# Patient Record
Sex: MALE | Race: OTHER | Employment: UNEMPLOYED | URBAN - NONMETROPOLITAN AREA
[De-identification: names, ages, dates, MRNs, and addresses within clinical notes are randomized per-mention and may not be internally consistent; named-entity substitution may affect disease eponyms.]

---

## 2017-04-05 ENCOUNTER — OFFICE VISIT (OUTPATIENT)
Dept: CARDIOLOGY | Age: 62
End: 2017-04-05

## 2017-04-05 VITALS
WEIGHT: 174 LBS | DIASTOLIC BLOOD PRESSURE: 78 MMHG | HEART RATE: 73 BPM | SYSTOLIC BLOOD PRESSURE: 132 MMHG | BODY MASS INDEX: 32.85 KG/M2 | HEIGHT: 61 IN

## 2017-04-05 DIAGNOSIS — I25.810 CORONARY ARTERY DISEASE INVOLVING CORONARY BYPASS GRAFT OF NATIVE HEART WITHOUT ANGINA PECTORIS: Primary | ICD-10-CM

## 2017-04-05 DIAGNOSIS — G81.91 RIGHT HEMIPARESIS (HCC): ICD-10-CM

## 2017-04-05 DIAGNOSIS — E78.00 PURE HYPERCHOLESTEROLEMIA: ICD-10-CM

## 2017-04-05 DIAGNOSIS — Z95.1 S/P CABG X 4: ICD-10-CM

## 2017-04-05 PROCEDURE — 93000 ELECTROCARDIOGRAM COMPLETE: CPT | Performed by: INTERNAL MEDICINE

## 2017-04-05 PROCEDURE — G8598 ASA/ANTIPLAT THER USED: HCPCS | Performed by: INTERNAL MEDICINE

## 2017-04-05 PROCEDURE — 3017F COLORECTAL CA SCREEN DOC REV: CPT | Performed by: INTERNAL MEDICINE

## 2017-04-05 PROCEDURE — 99213 OFFICE O/P EST LOW 20 MIN: CPT | Performed by: INTERNAL MEDICINE

## 2017-04-05 PROCEDURE — G8419 CALC BMI OUT NRM PARAM NOF/U: HCPCS | Performed by: INTERNAL MEDICINE

## 2017-04-05 PROCEDURE — 1036F TOBACCO NON-USER: CPT | Performed by: INTERNAL MEDICINE

## 2017-04-05 PROCEDURE — G8427 DOCREV CUR MEDS BY ELIG CLIN: HCPCS | Performed by: INTERNAL MEDICINE

## 2017-04-05 RX ORDER — ZOLPIDEM TARTRATE 5 MG/1
5 TABLET ORAL NIGHTLY PRN
COMMUNITY
End: 2017-11-15 | Stop reason: ALTCHOICE

## 2017-04-05 RX ORDER — NITROGLYCERIN 0.4 MG/1
0.4 TABLET SUBLINGUAL EVERY 5 MIN PRN
COMMUNITY
End: 2017-08-14 | Stop reason: SDUPTHER

## 2017-04-05 RX ORDER — RANOLAZINE 1000 MG/1
TABLET, EXTENDED RELEASE ORAL
Qty: 60 TABLET | Refills: 3 | Status: SHIPPED | OUTPATIENT
Start: 2017-04-05 | End: 2017-09-17 | Stop reason: SDUPTHER

## 2017-05-10 RX ORDER — ISOSORBIDE MONONITRATE 60 MG/1
TABLET, EXTENDED RELEASE ORAL
Qty: 90 TABLET | Refills: 3 | Status: SHIPPED | OUTPATIENT
Start: 2017-05-10 | End: 2017-08-09 | Stop reason: DRUGHIGH

## 2017-06-15 RX ORDER — FUROSEMIDE 20 MG/1
TABLET ORAL
Qty: 30 TABLET | Refills: 2 | Status: SHIPPED | OUTPATIENT
Start: 2017-06-15 | End: 2017-09-24 | Stop reason: SDUPTHER

## 2017-08-09 ENCOUNTER — OFFICE VISIT (OUTPATIENT)
Dept: CARDIOLOGY CLINIC | Age: 62
End: 2017-08-09
Payer: MEDICARE

## 2017-08-09 VITALS
SYSTOLIC BLOOD PRESSURE: 122 MMHG | WEIGHT: 168 LBS | HEIGHT: 61 IN | BODY MASS INDEX: 31.72 KG/M2 | DIASTOLIC BLOOD PRESSURE: 54 MMHG | HEART RATE: 82 BPM

## 2017-08-09 DIAGNOSIS — Z95.1 S/P CABG X 4: ICD-10-CM

## 2017-08-09 DIAGNOSIS — Z95.1 S/P CABG X 4: Primary | ICD-10-CM

## 2017-08-09 PROCEDURE — 99213 OFFICE O/P EST LOW 20 MIN: CPT | Performed by: INTERNAL MEDICINE

## 2017-08-09 PROCEDURE — 1036F TOBACCO NON-USER: CPT | Performed by: INTERNAL MEDICINE

## 2017-08-09 PROCEDURE — 3017F COLORECTAL CA SCREEN DOC REV: CPT | Performed by: INTERNAL MEDICINE

## 2017-08-09 PROCEDURE — G8417 CALC BMI ABV UP PARAM F/U: HCPCS | Performed by: INTERNAL MEDICINE

## 2017-08-09 PROCEDURE — G8427 DOCREV CUR MEDS BY ELIG CLIN: HCPCS | Performed by: INTERNAL MEDICINE

## 2017-08-09 PROCEDURE — G8598 ASA/ANTIPLAT THER USED: HCPCS | Performed by: INTERNAL MEDICINE

## 2017-08-09 RX ORDER — ISOSORBIDE MONONITRATE 30 MG/1
30 TABLET, EXTENDED RELEASE ORAL DAILY
COMMUNITY
End: 2017-08-09 | Stop reason: SDUPTHER

## 2017-08-09 RX ORDER — ISOSORBIDE MONONITRATE 30 MG/1
30 TABLET, EXTENDED RELEASE ORAL DAILY
Qty: 30 TABLET | Refills: 3 | Status: SHIPPED | OUTPATIENT
Start: 2017-08-09 | End: 2017-12-07 | Stop reason: SDUPTHER

## 2017-08-14 RX ORDER — NITROGLYCERIN 0.4 MG/1
0.4 TABLET SUBLINGUAL EVERY 5 MIN PRN
Qty: 25 TABLET | Refills: 3 | Status: SHIPPED | OUTPATIENT
Start: 2017-08-14

## 2017-09-18 RX ORDER — RANOLAZINE 1000 MG/1
TABLET, FILM COATED, EXTENDED RELEASE ORAL
Qty: 60 TABLET | Refills: 2 | Status: SHIPPED | OUTPATIENT
Start: 2017-09-18 | End: 2017-12-15 | Stop reason: SDUPTHER

## 2017-09-25 RX ORDER — FUROSEMIDE 20 MG/1
TABLET ORAL
Qty: 30 TABLET | Refills: 2 | Status: SHIPPED | OUTPATIENT
Start: 2017-09-25 | End: 2017-12-26 | Stop reason: SDUPTHER

## 2017-11-15 ENCOUNTER — OFFICE VISIT (OUTPATIENT)
Dept: CARDIOLOGY CLINIC | Age: 62
End: 2017-11-15
Payer: MEDICARE

## 2017-11-15 VITALS
DIASTOLIC BLOOD PRESSURE: 60 MMHG | HEART RATE: 72 BPM | BODY MASS INDEX: 32.1 KG/M2 | SYSTOLIC BLOOD PRESSURE: 122 MMHG | WEIGHT: 170 LBS | HEIGHT: 61 IN

## 2017-11-15 DIAGNOSIS — I25.810 CORONARY ARTERY DISEASE INVOLVING CORONARY BYPASS GRAFT OF NATIVE HEART WITHOUT ANGINA PECTORIS: ICD-10-CM

## 2017-11-15 DIAGNOSIS — I10 ESSENTIAL HYPERTENSION: Primary | ICD-10-CM

## 2017-11-15 DIAGNOSIS — Z95.1 S/P CABG X 4: ICD-10-CM

## 2017-11-15 PROCEDURE — 3017F COLORECTAL CA SCREEN DOC REV: CPT | Performed by: INTERNAL MEDICINE

## 2017-11-15 PROCEDURE — 99213 OFFICE O/P EST LOW 20 MIN: CPT | Performed by: INTERNAL MEDICINE

## 2017-11-15 PROCEDURE — G8598 ASA/ANTIPLAT THER USED: HCPCS | Performed by: INTERNAL MEDICINE

## 2017-11-15 PROCEDURE — 1036F TOBACCO NON-USER: CPT | Performed by: INTERNAL MEDICINE

## 2017-11-15 PROCEDURE — G8484 FLU IMMUNIZE NO ADMIN: HCPCS | Performed by: INTERNAL MEDICINE

## 2017-11-15 PROCEDURE — G8427 DOCREV CUR MEDS BY ELIG CLIN: HCPCS | Performed by: INTERNAL MEDICINE

## 2017-11-15 PROCEDURE — G8417 CALC BMI ABV UP PARAM F/U: HCPCS | Performed by: INTERNAL MEDICINE

## 2017-11-27 ENCOUNTER — HOSPITAL ENCOUNTER (OUTPATIENT)
Dept: NON INVASIVE DIAGNOSTICS | Age: 62
Discharge: HOME | End: 2017-11-27
Payer: MEDICARE

## 2017-11-27 DIAGNOSIS — I10 ESSENTIAL HYPERTENSION: ICD-10-CM

## 2017-11-27 DIAGNOSIS — I25.810 CORONARY ARTERY DISEASE INVOLVING CORONARY BYPASS GRAFT OF NATIVE HEART WITHOUT ANGINA PECTORIS: ICD-10-CM

## 2017-11-27 LAB
LV EF: 43 %
LVEF MODALITY: NORMAL

## 2017-11-27 PROCEDURE — 93306 TTE W/DOPPLER COMPLETE: CPT

## 2017-12-07 RX ORDER — ISOSORBIDE MONONITRATE 30 MG/1
TABLET, EXTENDED RELEASE ORAL
Qty: 30 TABLET | Refills: 5 | Status: SHIPPED | OUTPATIENT
Start: 2017-12-07 | End: 2018-06-09 | Stop reason: SDUPTHER

## 2017-12-15 RX ORDER — RANOLAZINE 1000 MG/1
TABLET, FILM COATED, EXTENDED RELEASE ORAL
Qty: 180 TABLET | Refills: 1 | Status: SHIPPED | OUTPATIENT
Start: 2017-12-15 | End: 2018-06-11 | Stop reason: SDUPTHER

## 2017-12-26 RX ORDER — FUROSEMIDE 20 MG/1
TABLET ORAL
Qty: 90 TABLET | Refills: 1 | Status: SHIPPED | OUTPATIENT
Start: 2017-12-26 | End: 2018-06-18 | Stop reason: SDUPTHER

## 2018-05-16 ENCOUNTER — OFFICE VISIT (OUTPATIENT)
Dept: CARDIOLOGY CLINIC | Age: 63
End: 2018-05-16
Payer: MEDICARE

## 2018-05-16 VITALS
HEART RATE: 78 BPM | SYSTOLIC BLOOD PRESSURE: 132 MMHG | BODY MASS INDEX: 31.53 KG/M2 | DIASTOLIC BLOOD PRESSURE: 60 MMHG | WEIGHT: 167 LBS | HEIGHT: 61 IN

## 2018-05-16 DIAGNOSIS — Z87.891 EX-SMOKER: ICD-10-CM

## 2018-05-16 DIAGNOSIS — Z95.1 S/P CABG X 4: ICD-10-CM

## 2018-05-16 DIAGNOSIS — I10 ESSENTIAL HYPERTENSION: ICD-10-CM

## 2018-05-16 DIAGNOSIS — I50.22 CHRONIC SYSTOLIC CONGESTIVE HEART FAILURE, NYHA CLASS 2 (HCC): ICD-10-CM

## 2018-05-16 DIAGNOSIS — E78.5 DYSLIPIDEMIA, GOAL LDL BELOW 100: ICD-10-CM

## 2018-05-16 DIAGNOSIS — I35.0 MILD AORTIC STENOSIS: ICD-10-CM

## 2018-05-16 DIAGNOSIS — I25.810 CORONARY ARTERY DISEASE INVOLVING CORONARY BYPASS GRAFT OF NATIVE HEART WITHOUT ANGINA PECTORIS: Primary | ICD-10-CM

## 2018-05-16 PROCEDURE — 99213 OFFICE O/P EST LOW 20 MIN: CPT | Performed by: INTERNAL MEDICINE

## 2018-05-16 PROCEDURE — 1123F ACP DISCUSS/DSCN MKR DOCD: CPT | Performed by: INTERNAL MEDICINE

## 2018-05-16 PROCEDURE — 3017F COLORECTAL CA SCREEN DOC REV: CPT | Performed by: INTERNAL MEDICINE

## 2018-05-16 PROCEDURE — G8427 DOCREV CUR MEDS BY ELIG CLIN: HCPCS | Performed by: INTERNAL MEDICINE

## 2018-05-16 PROCEDURE — 93000 ELECTROCARDIOGRAM COMPLETE: CPT | Performed by: INTERNAL MEDICINE

## 2018-05-16 PROCEDURE — 1036F TOBACCO NON-USER: CPT | Performed by: INTERNAL MEDICINE

## 2018-05-16 PROCEDURE — G8598 ASA/ANTIPLAT THER USED: HCPCS | Performed by: INTERNAL MEDICINE

## 2018-05-16 PROCEDURE — 4040F PNEUMOC VAC/ADMIN/RCVD: CPT | Performed by: INTERNAL MEDICINE

## 2018-05-16 PROCEDURE — G8417 CALC BMI ABV UP PARAM F/U: HCPCS | Performed by: INTERNAL MEDICINE

## 2018-05-23 ENCOUNTER — HOSPITAL ENCOUNTER (OUTPATIENT)
Dept: ULTRASOUND IMAGING | Age: 63
Discharge: HOME | End: 2018-05-23
Payer: MEDICARE

## 2018-05-23 DIAGNOSIS — Z87.891 EX-SMOKER: ICD-10-CM

## 2018-05-23 PROCEDURE — 76706 US ABDL AORTA SCREEN AAA: CPT

## 2018-06-04 ENCOUNTER — TELEPHONE (OUTPATIENT)
Dept: CARDIOLOGY CLINIC | Age: 63
End: 2018-06-04

## 2018-06-11 RX ORDER — RANOLAZINE 1000 MG/1
TABLET, EXTENDED RELEASE ORAL
Qty: 180 TABLET | Refills: 1 | Status: SHIPPED | OUTPATIENT
Start: 2018-06-11 | End: 2018-12-10 | Stop reason: SDUPTHER

## 2018-06-11 RX ORDER — ISOSORBIDE MONONITRATE 30 MG/1
TABLET, EXTENDED RELEASE ORAL
Qty: 90 TABLET | Refills: 3 | Status: SHIPPED | OUTPATIENT
Start: 2018-06-11 | End: 2019-04-08 | Stop reason: SDUPTHER

## 2018-06-20 RX ORDER — FUROSEMIDE 20 MG/1
TABLET ORAL
Qty: 90 TABLET | Refills: 3 | Status: SHIPPED | OUTPATIENT
Start: 2018-06-20 | End: 2018-06-20 | Stop reason: SDUPTHER

## 2018-06-26 RX ORDER — FUROSEMIDE 20 MG/1
TABLET ORAL
Qty: 90 TABLET | Refills: 3 | Status: SHIPPED | OUTPATIENT
Start: 2018-06-26 | End: 2020-11-18

## 2018-12-10 RX ORDER — RANOLAZINE 1000 MG/1
TABLET, FILM COATED, EXTENDED RELEASE ORAL
Qty: 180 TABLET | Refills: 1 | Status: SHIPPED | OUTPATIENT
Start: 2018-12-10 | End: 2019-04-08 | Stop reason: SDUPTHER

## 2019-03-05 ENCOUNTER — HOSPITAL ENCOUNTER (OUTPATIENT)
Age: 64
Setting detail: SPECIMEN
Discharge: HOME | End: 2019-03-05
Payer: MEDICARE

## 2019-03-05 LAB
ABSOLUTE EOS #: 0.1 K/UL (ref 0–0.44)
ABSOLUTE IMMATURE GRANULOCYTE: 0.05 K/UL (ref 0–0.3)
ABSOLUTE LYMPH #: 0.9 K/UL (ref 1.1–3.7)
ABSOLUTE MONO #: 0.61 K/UL (ref 0.1–1.2)
BASOPHILS # BLD: 0 % (ref 0–2)
BASOPHILS ABSOLUTE: 0.03 K/UL (ref 0–0.2)
DIFFERENTIAL TYPE: ABNORMAL
EOSINOPHILS RELATIVE PERCENT: 1 % (ref 1–4)
HCT VFR BLD CALC: 41 % (ref 40.7–50.3)
HEMOGLOBIN: 13.7 G/DL (ref 13–17)
IMMATURE GRANULOCYTES: 1 %
LYMPHOCYTES # BLD: 13 % (ref 24–43)
MCH RBC QN AUTO: 31.6 PG (ref 25.2–33.5)
MCHC RBC AUTO-ENTMCNC: 33.4 G/DL (ref 28.4–34.8)
MCV RBC AUTO: 94.7 FL (ref 82.6–102.9)
MONOCYTES # BLD: 9 % (ref 3–12)
NRBC AUTOMATED: 0 PER 100 WBC
PDW BLD-RTO: 12.4 % (ref 11.8–14.4)
PLATELET # BLD: 233 K/UL (ref 138–453)
PLATELET ESTIMATE: ABNORMAL
PMV BLD AUTO: 10.6 FL (ref 8.1–13.5)
RBC # BLD: 4.33 M/UL (ref 4.21–5.77)
RBC # BLD: ABNORMAL 10*6/UL
SEG NEUTROPHILS: 76 % (ref 36–65)
SEGMENTED NEUTROPHILS ABSOLUTE COUNT: 5.43 K/UL (ref 1.5–8.1)
WBC # BLD: 7.1 K/UL (ref 3.5–11.3)
WBC # BLD: ABNORMAL 10*3/UL

## 2019-03-06 LAB
IRON SATURATION: 22 % (ref 20–55)
IRON: 65 UG/DL (ref 59–158)
TOTAL IRON BINDING CAPACITY: 296 UG/DL (ref 250–450)
UNSATURATED IRON BINDING CAPACITY: 231 UG/DL (ref 112–347)

## 2019-04-08 RX ORDER — RANOLAZINE 1000 MG/1
TABLET, EXTENDED RELEASE ORAL
Qty: 180 TABLET | Refills: 0 | Status: SHIPPED | OUTPATIENT
Start: 2019-04-08 | End: 2019-06-10 | Stop reason: SDUPTHER

## 2019-04-08 RX ORDER — ISOSORBIDE MONONITRATE 30 MG/1
TABLET, EXTENDED RELEASE ORAL
Qty: 90 TABLET | Refills: 0 | Status: SHIPPED | OUTPATIENT
Start: 2019-04-08 | End: 2019-09-05 | Stop reason: SDUPTHER

## 2019-04-08 RX ORDER — FUROSEMIDE 20 MG/1
TABLET ORAL
Qty: 90 TABLET | Refills: 0 | Status: SHIPPED | OUTPATIENT
Start: 2019-04-08 | End: 2020-10-14 | Stop reason: SDUPTHER

## 2019-05-06 ENCOUNTER — HOSPITAL ENCOUNTER (OUTPATIENT)
Age: 64
Discharge: HOME | End: 2019-05-06
Payer: MEDICARE

## 2019-05-06 ENCOUNTER — HOSPITAL ENCOUNTER (OUTPATIENT)
Dept: GENERAL RADIOLOGY | Age: 64
Discharge: HOME | End: 2019-05-06
Payer: MEDICARE

## 2019-05-06 DIAGNOSIS — R52 PAIN: ICD-10-CM

## 2019-05-06 PROCEDURE — 73630 X-RAY EXAM OF FOOT: CPT

## 2019-06-10 RX ORDER — RANOLAZINE 1000 MG/1
TABLET, EXTENDED RELEASE ORAL
Qty: 180 TABLET | Refills: 2 | Status: SHIPPED | OUTPATIENT
Start: 2019-06-10 | End: 2020-04-28

## 2019-06-14 RX ORDER — FUROSEMIDE 20 MG/1
TABLET ORAL
Qty: 90 TABLET | Refills: 3 | Status: SHIPPED | OUTPATIENT
Start: 2019-06-14 | End: 2020-06-12

## 2019-08-07 ENCOUNTER — OFFICE VISIT (OUTPATIENT)
Dept: CARDIOLOGY CLINIC | Age: 64
End: 2019-08-07
Payer: MEDICARE

## 2019-08-07 VITALS
BODY MASS INDEX: 29.83 KG/M2 | HEIGHT: 61 IN | SYSTOLIC BLOOD PRESSURE: 128 MMHG | DIASTOLIC BLOOD PRESSURE: 70 MMHG | HEART RATE: 70 BPM | WEIGHT: 158 LBS

## 2019-08-07 DIAGNOSIS — Z95.1 S/P CABG X 4: ICD-10-CM

## 2019-08-07 DIAGNOSIS — I25.810 CORONARY ARTERY DISEASE INVOLVING CORONARY BYPASS GRAFT OF NATIVE HEART WITHOUT ANGINA PECTORIS: Primary | ICD-10-CM

## 2019-08-07 PROCEDURE — 1036F TOBACCO NON-USER: CPT | Performed by: PHYSICIAN ASSISTANT

## 2019-08-07 PROCEDURE — G8419 CALC BMI OUT NRM PARAM NOF/U: HCPCS | Performed by: PHYSICIAN ASSISTANT

## 2019-08-07 PROCEDURE — 1123F ACP DISCUSS/DSCN MKR DOCD: CPT | Performed by: PHYSICIAN ASSISTANT

## 2019-08-07 PROCEDURE — G8427 DOCREV CUR MEDS BY ELIG CLIN: HCPCS | Performed by: PHYSICIAN ASSISTANT

## 2019-08-07 PROCEDURE — 3017F COLORECTAL CA SCREEN DOC REV: CPT | Performed by: PHYSICIAN ASSISTANT

## 2019-08-07 PROCEDURE — 99213 OFFICE O/P EST LOW 20 MIN: CPT | Performed by: PHYSICIAN ASSISTANT

## 2019-08-07 PROCEDURE — G8598 ASA/ANTIPLAT THER USED: HCPCS | Performed by: PHYSICIAN ASSISTANT

## 2019-08-07 PROCEDURE — 4040F PNEUMOC VAC/ADMIN/RCVD: CPT | Performed by: PHYSICIAN ASSISTANT

## 2019-08-07 PROCEDURE — 93000 ELECTROCARDIOGRAM COMPLETE: CPT | Performed by: PHYSICIAN ASSISTANT

## 2019-08-07 RX ORDER — MIRTAZAPINE 45 MG/1
1 TABLET, FILM COATED ORAL DAILY
Refills: 3 | COMMUNITY
Start: 2019-07-14 | End: 2020-10-14 | Stop reason: SDUPTHER

## 2019-08-07 NOTE — PROGRESS NOTES
ONE TABLET BY MOUTH TWICE DAILY 180 tablet 3    furosemide (LASIX) 20 MG tablet TAKE ONE TABLET BY MOUTH ONCE DAILY 90 tablet 3    nitroGLYCERIN (NITROSTAT) 0.4 MG SL tablet Place 1 tablet under the tongue every 5 minutes as needed for Chest pain up to max of 3 total doses. If no relief after 1 dose, call 911. 25 tablet 3    pioglitazone (ACTOS) 30 MG tablet TAKE ONE TABLET BY MOUTH ONCE DAILY 30 tablet 5    traMADol (ULTRAM) 50 MG tablet TAKE ONE TABLET BY MOUTH TWICE DAILY AS NEEDED FOR PAIN 60 tablet 2    mirtazapine (REMERON) 15 MG tablet TAKE ONE-HALF TABLET BY MOUTH ONCE DAILY AT BEDTIME 15 tablet 5    enalapril (VASOTEC) 2.5 MG tablet TAKE ONE TABLET BY MOUTH ONCE DAILY 30 tablet 5    sitaGLIPtin (JANUVIA) 100 MG tablet TAKE ONE TABLET BY MOUTH ONCE DAILY 90 tablet 1    metFORMIN (GLUCOPHAGE) 1000 MG tablet TAKE ONE TABLET BY MOUTH TWICE DAILY WITH MEALS 60 tablet 5    pantoprazole (PROTONIX) 40 MG tablet TAKE ONE TABLET BY MOUTH ONCE DAILY 30 tablet 5    pravastatin (PRAVACHOL) 40 MG tablet TAKE ONE TABLET BY MOUTH ONCE DAILY 30 tablet 5    ferrous sulfate 325 (65 FE) MG tablet TAKE ONE TABLET BY MOUTH ONCE DAILY WITH BREAKFAST 30 tablet 5    Omega-3 Fatty Acids (FISH OIL BURP-LESS PO) Take by mouth      FREESTYLE LITE strip USE TO CHECK GLUCOSE ONCE DAILY AS NEEDED 100 each 11    FREESTYLE LITE strip CHECK GLUCOSE ONCE EVERY DAY AS NEEDED 30 each 11    acetaminophen (EQL ARTHRITIS PAIN RELIEF) 650 MG CR tablet Take 650 mg by mouth every 8 hours as needed for Pain.  aspirin 81 MG EC tablet Take 81 mg by mouth daily.  Calcium Polycarbophil (FIBER) 625 MG TABS Take 2 tablets by mouth daily.  Multiple Vitamin (MULTIVITAMIN PO) Take 1 tablet by mouth 2 times daily.  mirtazapine (REMERON) 45 MG tablet Take 1 tablet by mouth daily  3     No current facility-administered medications for this visit.         Social History     Socioeconomic History    Marital status: Single subtotally occluded.       4.    The circumflex has mild luminal irregularity.  It gives rise to OM1 and OM2.  OM1 is a small to medium caliber vessel without any             critical lesion.  OM2 is a small caliber vessel without any critical lesion.  The circumflex is severely diseased distally.        Diagnosis Orders   1. Coronary artery disease involving coronary bypass graft of native heart without angina pectoris  EKG 12 lead   2. S/P CABG x 4 lima to lad patent SVG to PDA patent SVG to CXM patent and diagonal closed          Orders Placed This Encounter   Procedures    EKG 12 lead     Order Specific Question:   Reason for Exam?     Answer: Other     Continue Dr Benton Dodd current treatment plan    Continue same current medications and with constant vigilance to changes in symptoms and also any potential side effects. Return for care if become worse or seek medical attention immediately. The patient is educated on symptoms of heart disease that include chest pain and passing out, dizziness, etc and to report them if there is any change or go to emergency room.        Follow up with myself in 1 year or sooner if needed  (Please note that portions of this note were completed with a voice recognition program.  Efforts were made to edit the dictation but occasionally words are mis-transcribed.)      Hannah Orona PA-C

## 2019-09-09 RX ORDER — ISOSORBIDE MONONITRATE 30 MG/1
TABLET, EXTENDED RELEASE ORAL
Qty: 90 TABLET | Refills: 4 | Status: SHIPPED | OUTPATIENT
Start: 2019-09-09

## 2020-02-04 ENCOUNTER — TELEPHONE (OUTPATIENT)
Dept: CARDIOLOGY CLINIC | Age: 65
End: 2020-02-04

## 2020-02-27 ENCOUNTER — TELEPHONE (OUTPATIENT)
Dept: CARDIOLOGY CLINIC | Age: 65
End: 2020-02-27

## 2020-02-27 NOTE — TELEPHONE ENCOUNTER
Pt called back explained to him its generic, he voiced understanding and will let us know if he needs anything else

## 2020-04-28 RX ORDER — RANOLAZINE 1000 MG/1
TABLET, EXTENDED RELEASE ORAL
Qty: 180 TABLET | Refills: 0 | Status: SHIPPED | OUTPATIENT
Start: 2020-04-28

## 2020-06-12 RX ORDER — FUROSEMIDE 20 MG/1
TABLET ORAL
Qty: 90 TABLET | Refills: 0 | Status: SHIPPED | OUTPATIENT
Start: 2020-06-12 | End: 2020-09-10

## 2020-08-03 RX ORDER — RANOLAZINE 1000 MG/1
TABLET, EXTENDED RELEASE ORAL
Qty: 180 TABLET | Refills: 0 | OUTPATIENT
Start: 2020-08-03

## 2020-09-10 RX ORDER — FUROSEMIDE 20 MG/1
TABLET ORAL
Qty: 90 TABLET | Refills: 0 | Status: SHIPPED | OUTPATIENT
Start: 2020-09-10 | End: 2020-10-14

## 2020-09-16 ENCOUNTER — HOSPITAL ENCOUNTER (OUTPATIENT)
Age: 65
Discharge: HOME | End: 2020-09-16
Payer: COMMERCIAL

## 2020-09-16 ENCOUNTER — OFFICE VISIT (OUTPATIENT)
Dept: CARDIOLOGY CLINIC | Age: 65
End: 2020-09-16
Payer: COMMERCIAL

## 2020-09-16 VITALS
HEIGHT: 60 IN | SYSTOLIC BLOOD PRESSURE: 130 MMHG | HEART RATE: 73 BPM | WEIGHT: 155 LBS | BODY MASS INDEX: 30.43 KG/M2 | DIASTOLIC BLOOD PRESSURE: 68 MMHG

## 2020-09-16 DIAGNOSIS — I25.810 CORONARY ARTERY DISEASE INVOLVING CORONARY BYPASS GRAFT OF NATIVE HEART WITHOUT ANGINA PECTORIS: ICD-10-CM

## 2020-09-16 DIAGNOSIS — I63.50 CEREBRAL ARTERY OCCLUSION WITH CEREBRAL INFARCTION (HCC): ICD-10-CM

## 2020-09-16 LAB
BASOPHILS # BLD: 0.4 %
BASOPHILS ABSOLUTE: 0 THOU/MM3 (ref 0–0.1)
EOSINOPHIL # BLD: 1.4 %
EOSINOPHILS ABSOLUTE: 0.1 THOU/MM3 (ref 0–0.4)
ERYTHROCYTE [DISTWIDTH] IN BLOOD BY AUTOMATED COUNT: 13.2 % (ref 11.5–14.5)
ERYTHROCYTE [DISTWIDTH] IN BLOOD BY AUTOMATED COUNT: 47.8 FL (ref 35–45)
HCT VFR BLD CALC: 37.4 % (ref 42–52)
HEMOGLOBIN: 12.1 GM/DL (ref 14–18)
IMMATURE GRANS (ABS): 0.02 THOU/MM3 (ref 0–0.07)
IMMATURE GRANULOCYTES: 0.3 %
LYMPHOCYTES # BLD: 11.7 %
LYMPHOCYTES ABSOLUTE: 0.8 THOU/MM3 (ref 1–4.8)
MCH RBC QN AUTO: 32.3 PG (ref 26–33)
MCHC RBC AUTO-ENTMCNC: 32.4 GM/DL (ref 32.2–35.5)
MCV RBC AUTO: 99.7 FL (ref 80–94)
MONOCYTES # BLD: 8.1 %
MONOCYTES ABSOLUTE: 0.6 THOU/MM3 (ref 0.4–1.3)
NUCLEATED RED BLOOD CELLS: 0 /100 WBC
PLATELET # BLD: 207 THOU/MM3 (ref 130–400)
PMV BLD AUTO: 11.4 FL (ref 9.4–12.4)
RBC # BLD: 3.75 MILL/MM3 (ref 4.7–6.1)
SEG NEUTROPHILS: 78.1 %
SEGMENTED NEUTROPHILS ABSOLUTE COUNT: 5.4 THOU/MM3 (ref 1.8–7.7)
WBC # BLD: 6.9 THOU/MM3 (ref 4.8–10.8)

## 2020-09-16 PROCEDURE — 83880 ASSAY OF NATRIURETIC PEPTIDE: CPT

## 2020-09-16 PROCEDURE — 4004F PT TOBACCO SCREEN RCVD TLK: CPT | Performed by: INTERNAL MEDICINE

## 2020-09-16 PROCEDURE — 80048 BASIC METABOLIC PNL TOTAL CA: CPT

## 2020-09-16 PROCEDURE — G8417 CALC BMI ABV UP PARAM F/U: HCPCS | Performed by: INTERNAL MEDICINE

## 2020-09-16 PROCEDURE — 3017F COLORECTAL CA SCREEN DOC REV: CPT | Performed by: INTERNAL MEDICINE

## 2020-09-16 PROCEDURE — 1123F ACP DISCUSS/DSCN MKR DOCD: CPT | Performed by: INTERNAL MEDICINE

## 2020-09-16 PROCEDURE — G8427 DOCREV CUR MEDS BY ELIG CLIN: HCPCS | Performed by: INTERNAL MEDICINE

## 2020-09-16 PROCEDURE — 85025 COMPLETE CBC W/AUTO DIFF WBC: CPT

## 2020-09-16 PROCEDURE — 4040F PNEUMOC VAC/ADMIN/RCVD: CPT | Performed by: INTERNAL MEDICINE

## 2020-09-16 PROCEDURE — 36415 COLL VENOUS BLD VENIPUNCTURE: CPT

## 2020-09-16 PROCEDURE — 93000 ELECTROCARDIOGRAM COMPLETE: CPT | Performed by: INTERNAL MEDICINE

## 2020-09-16 PROCEDURE — 99214 OFFICE O/P EST MOD 30 MIN: CPT | Performed by: INTERNAL MEDICINE

## 2020-09-16 NOTE — PROGRESS NOTES
Pt here for 1 year check up     Denies chest pain, SOB or palpitations    C/O indigestion.      EKG DONE

## 2020-09-16 NOTE — PROGRESS NOTES
[address] [place], [place] [address] [facility] [address]  Dept: [phone]  Dept Fax: [phone]  Loc: [phone]    Visit Date: 9/16/2020    Mr. Yvon Kim is a 77 y.o. male  who presented for:  Chief Complaint   Patient presents with    1 Year Follow Up       HPI:   76 yo M c CAD s/p CABG, DM, LV dysfunction EF 40-45%,  CVA is here for a follow up. Denies any chest pain, sob, palpitations, lightheadedness, dizziness, orthopnea, PND or pedal edema. Current Outpatient Medications:     furosemide (LASIX) 20 MG tablet, Take 1 tablet by mouth once daily, Disp: 90 tablet, Rfl: 0    ranolazine (RANEXA) 1000 MG extended release tablet, Take 1 tablet by mouth twice daily, Disp: 180 tablet, Rfl: 0    metoprolol tartrate (LOPRESSOR) 25 MG tablet, TAKE 1 TABLET BY MOUTH TWICE DAILY, Disp: 180 tablet, Rfl: 3    isosorbide mononitrate (IMDUR) 30 MG extended release tablet, TAKE 1 TABLET BY MOUTH ONCE DAILY, Disp: 90 tablet, Rfl: 4    mirtazapine (REMERON) 45 MG tablet, Take 1 tablet by mouth daily, Disp: , Rfl: 3    furosemide (LASIX) 20 MG tablet, TAKE 1 TABLET BY MOUTH ONCE DAILY, Disp: 90 tablet, Rfl: 0    furosemide (LASIX) 20 MG tablet, TAKE ONE TABLET BY MOUTH ONCE DAILY, Disp: 90 tablet, Rfl: 3    nitroGLYCERIN (NITROSTAT) 0.4 MG SL tablet, Place 1 tablet under the tongue every 5 minutes as needed for Chest pain up to max of 3 total doses.  If no relief after 1 dose, call 911., Disp: 25 tablet, Rfl: 3    pioglitazone (ACTOS) 30 MG tablet, TAKE ONE TABLET BY MOUTH ONCE DAILY, Disp: 30 tablet, Rfl: 5    traMADol (ULTRAM) 50 MG tablet, TAKE ONE TABLET BY MOUTH TWICE DAILY AS NEEDED FOR PAIN, Disp: 60 tablet, Rfl: 2    mirtazapine (REMERON) 15 MG tablet, TAKE ONE-HALF TABLET BY MOUTH ONCE DAILY AT BEDTIME, Disp: 15 tablet, Rfl: 5    enalapril (VASOTEC) 2.5 MG tablet, TAKE ONE TABLET BY MOUTH ONCE DAILY, Disp: 30 tablet, Rfl: 5    sitaGLIPtin (Sara Mclaughlin) 100 MG tablet, TAKE ONE TABLET BY MOUTH ONCE DAILY, Disp: 90 tablet, Rfl: 1    metFORMIN (GLUCOPHAGE) 1000 MG tablet, TAKE ONE TABLET BY MOUTH TWICE DAILY WITH MEALS, Disp: 60 tablet, Rfl: 5    pantoprazole (PROTONIX) 40 MG tablet, TAKE ONE TABLET BY MOUTH ONCE DAILY, Disp: 30 tablet, Rfl: 5    pravastatin (PRAVACHOL) 40 MG tablet, TAKE ONE TABLET BY MOUTH ONCE DAILY, Disp: 30 tablet, Rfl: 5    ferrous sulfate 325 (65 FE) MG tablet, TAKE ONE TABLET BY MOUTH ONCE DAILY WITH BREAKFAST, Disp: 30 tablet, Rfl: 5    Omega-3 Fatty Acids (FISH OIL BURP-LESS PO), Take by mouth, Disp: , Rfl:     FREESTYLE LITE strip, USE TO CHECK GLUCOSE ONCE DAILY AS NEEDED, Disp: 100 each, Rfl: 11    FREESTYLE LITE strip, CHECK GLUCOSE ONCE EVERY DAY AS NEEDED, Disp: 30 each, Rfl: 11    acetaminophen (EQL ARTHRITIS PAIN RELIEF) 650 MG CR tablet, Take 650 mg by mouth every 8 hours as needed for Pain., Disp: , Rfl:     aspirin 81 MG EC tablet, Take 81 mg by mouth daily. , Disp: , Rfl:     Calcium Polycarbophil (FIBER) 625 MG TABS, Take 2 tablets by mouth daily. , Disp: , Rfl:     Multiple Vitamin (MULTIVITAMIN PO), Take 1 tablet by mouth 2 times daily. , Disp: , Rfl:     Past [address]  has a past medical history of Arthritis, CAD (coronary artery disease), Cataract, Glaucoma, Hearing aid worn, Hypertension, Type II or unspecified type diabetes mellitus without mention of complication, not stated as uncontrolled, and Unspecified cerebral artery occlusion with cerebral infarction. Social History  Pieter  reports that he quit smoking about 35 years ago. He has never used smokeless tobacco. He reports that he does not drink alcohol or use drugs. Family History  Pieter family history includes Diabetes in his mother; Heart Disease in his father and mother; High Blood Pressure in his mother.     Past Surgical History   Past Surgical History:   Procedure Laterality Date    COLONOSCOPY  9/2/15    CORONARY ARTERY BYPASS GRAFT 12-30-11    NECK SURGERY      left side blood clot    STOMACH SURGERY      UPPER GASTROINTESTINAL ENDOSCOPY         Subjective:     REVIEW OF SYSTEMS  Constitutional: denies sweats, chills and fever  HENT: denies  congestion, sinus pressure, sneezing and sore throat. Eyes: denies  pain, discharge, redness and itching. Respiratory: denies apnea, cough  Gastrointestinal: denies blood in stool, constipation, diarrhea   Endocrine: denies cold intolerance, heat intolerance, polydipsia. Genitourinary: denies dysuria, enuresis, flank pain and hematuria. Musculoskeletal: denies arthralgias, joint swelling and neck pain. Neurological: denies numbness and headaches. Psychiatric/Behavioral: denies agitation, confusion, decreased concentration and dysphoric mood    All others reviewed and are negative. Objective:     /68   Pulse 73   Ht 5' (1.524 m)   Wt 155 lb (70.3 kg)   BMI 30.27 kg/m²     Wt Readings from Last 3 Encounters:   09/16/20 155 lb (70.3 kg)   08/07/19 158 lb (71.7 kg)   05/16/18 167 lb (75.8 kg)     BP Readings from Last 3 Encounters:   09/16/20 130/68   08/07/19 128/70   05/16/18 132/60       PHYSICAL EXAM  Constitutional: Oriented to person, place, and time. Appears well-developed and well-nourished. HENT:   Head: Normocephalic and atraumatic. Eyes: EOM are normal. Pupils are equal, round, and reactive to light. Neck: Normal range of motion. Neck supple. No JVD present. Cardiovascular: Normal rate , normal heart sounds and intact distal pulses. Pulmonary/Chest: Effort normal and breath sounds normal. No respiratory distress. No wheezes. No rales. Abdominal: Soft. Bowel sounds are normal. No distension. There is no tenderness. Musculoskeletal: Normal range of motion. No edema. Neurological: Alert and oriented to person, place, and time. No cranial nerve deficit. Coordination normal.   Skin: Skin is warm and dry. Psychiatric: Normal mood and affect.        Lab Results further questions. Return in about 4 weeks (around 10/14/2020) for Review testing, Regular follow up.        Electronically signed by Janessa Pearce MD [facility]  9/16/2020 at 3:22 PM EDT

## 2020-09-17 LAB
ANION GAP SERPL CALCULATED.3IONS-SCNC: 10 MEQ/L (ref 8–16)
BUN BLDV-MCNC: 17 MG/DL (ref 7–22)
CALCIUM SERPL-MCNC: 8.8 MG/DL (ref 8.5–10.5)
CHLORIDE BLD-SCNC: 100 MEQ/L (ref 98–111)
CO2: 27 MEQ/L (ref 23–33)
CREAT SERPL-MCNC: 0.8 MG/DL (ref 0.4–1.2)
GFR SERPL CREATININE-BSD FRML MDRD: > 90 ML/MIN/1.73M2
GLUCOSE BLD-MCNC: 177 MG/DL (ref 70–108)
POTASSIUM SERPL-SCNC: 5 MEQ/L (ref 3.5–5.2)
PRO-BNP: 1541 PG/ML (ref 0–900)
SODIUM BLD-SCNC: 137 MEQ/L (ref 135–145)

## 2020-09-21 ENCOUNTER — HOSPITAL ENCOUNTER (OUTPATIENT)
Dept: NON INVASIVE DIAGNOSTICS | Age: 65
Discharge: HOME | End: 2020-09-21
Payer: MEDICARE

## 2020-09-21 LAB
LV EF: 46 %
LVEF MODALITY: NORMAL

## 2020-09-21 PROCEDURE — 93306 TTE W/DOPPLER COMPLETE: CPT

## 2020-10-14 ENCOUNTER — OFFICE VISIT (OUTPATIENT)
Dept: CARDIOLOGY CLINIC | Age: 65
End: 2020-10-14
Payer: MEDICARE

## 2020-10-14 VITALS
HEART RATE: 64 BPM | HEIGHT: 60 IN | BODY MASS INDEX: 30.63 KG/M2 | WEIGHT: 156 LBS | DIASTOLIC BLOOD PRESSURE: 60 MMHG | SYSTOLIC BLOOD PRESSURE: 118 MMHG

## 2020-10-14 PROCEDURE — 1036F TOBACCO NON-USER: CPT | Performed by: INTERNAL MEDICINE

## 2020-10-14 PROCEDURE — 1123F ACP DISCUSS/DSCN MKR DOCD: CPT | Performed by: INTERNAL MEDICINE

## 2020-10-14 PROCEDURE — G8484 FLU IMMUNIZE NO ADMIN: HCPCS | Performed by: INTERNAL MEDICINE

## 2020-10-14 PROCEDURE — 3017F COLORECTAL CA SCREEN DOC REV: CPT | Performed by: INTERNAL MEDICINE

## 2020-10-14 PROCEDURE — 4040F PNEUMOC VAC/ADMIN/RCVD: CPT | Performed by: INTERNAL MEDICINE

## 2020-10-14 PROCEDURE — 99214 OFFICE O/P EST MOD 30 MIN: CPT | Performed by: INTERNAL MEDICINE

## 2020-10-14 PROCEDURE — G8417 CALC BMI ABV UP PARAM F/U: HCPCS | Performed by: INTERNAL MEDICINE

## 2020-10-14 PROCEDURE — G8427 DOCREV CUR MEDS BY ELIG CLIN: HCPCS | Performed by: INTERNAL MEDICINE

## 2020-10-14 RX ORDER — FOLIC ACID/MV,IRON,MIN/LUTEIN 0.4-18-25
TABLET ORAL
COMMUNITY
Start: 2019-03-01 | End: 2020-10-14

## 2020-10-14 RX ORDER — MIRTAZAPINE 45 MG/1
45 TABLET, FILM COATED ORAL NIGHTLY
COMMUNITY

## 2020-10-14 RX ORDER — PYRITHIONE ZINC 10 MG/ML
SHAMPOO TOPICAL
COMMUNITY
Start: 2019-03-01

## 2020-10-14 RX ORDER — TRAZODONE HYDROCHLORIDE 50 MG/1
TABLET ORAL
COMMUNITY
Start: 2019-01-26 | End: 2020-10-14

## 2020-10-14 NOTE — PROGRESS NOTES
[address] [place] [address] [facility] [address]  Dept: [phone]  Dept Fax: [phone]  Loc: [phone]    Visit Date: 10/14/2020    Mr. Bety Zarco is a 77 y.o. male  who presented for:  Chief Complaint   Patient presents with    Check-Up     fu echo     Coronary Artery Disease       HPI:   76 yo M c CAD s/p CABG, DM, LV dysfunction EF 40-45%,  CVA is here for a follow up. Patient has a fall recently, states he felt weak and had diarrhea. Denies any chest pain, sob, palpitations, lightheadedness, dizziness, orthopnea, PND or pedal edema. Current Outpatient Medications:     methylcellulose (EQ FIBER THERAPY) 500 MG TABS, EQ Fiber Therapy 500MG Oral Tablet EQ Fiber Therapy 500MG Oral Tablet 03/01/2019 Provider: 03-  [facility] ([id number]), Disp: , Rfl:     mirtazapine (REMERON) 45 MG tablet, Take 45 mg by mouth nightly, Disp: , Rfl:     ranolazine (RANEXA) 1000 MG extended release tablet, Take 1 tablet by mouth twice daily, Disp: 180 tablet, Rfl: 0    metoprolol tartrate (LOPRESSOR) 25 MG tablet, TAKE 1 TABLET BY MOUTH TWICE DAILY, Disp: 180 tablet, Rfl: 3    isosorbide mononitrate (IMDUR) 30 MG extended release tablet, TAKE 1 TABLET BY MOUTH ONCE DAILY, Disp: 90 tablet, Rfl: 4    furosemide (LASIX) 20 MG tablet, TAKE ONE TABLET BY MOUTH ONCE DAILY, Disp: 90 tablet, Rfl: 3    nitroGLYCERIN (NITROSTAT) 0.4 MG SL tablet, Place 1 tablet under the tongue every 5 minutes as needed for Chest pain up to max of 3 total doses.  If no relief after 1 dose, call 911., Disp: 25 tablet, Rfl: 3    pioglitazone (ACTOS) 30 MG tablet, TAKE ONE TABLET BY MOUTH ONCE DAILY, Disp: 30 tablet, Rfl: 5    traMADol (ULTRAM) 50 MG tablet, TAKE ONE TABLET BY MOUTH TWICE DAILY AS NEEDED FOR PAIN, Disp: 60 tablet, Rfl: 2    enalapril (VASOTEC) 2.5 MG tablet, TAKE ONE TABLET BY MOUTH ONCE DAILY, Disp: 30 tablet, Rfl: 5    sitaGLIPtin GASTROINTESTINAL ENDOSCOPY         Subjective:     REVIEW OF SYSTEMS  Constitutional: denies sweats, chills and fever  HENT: denies  congestion, sinus pressure, sneezing and sore throat. Eyes: denies  pain, discharge, redness and itching. Respiratory: denies apnea, cough  Gastrointestinal: denies blood in stool, constipation, diarrhea   Endocrine: denies cold intolerance, heat intolerance, polydipsia. Genitourinary: denies dysuria, enuresis, flank pain and hematuria. Musculoskeletal: denies arthralgias, joint swelling and neck pain. Neurological: denies numbness and headaches. Psychiatric/Behavioral: denies agitation, confusion, decreased concentration and dysphoric mood    All others reviewed and are negative. Objective:     /60   Pulse 64   Ht 5' (1.524 m)   Wt 156 lb (70.8 kg)   BMI 30.47 kg/m²     Wt Readings from Last 3 Encounters:   10/14/20 156 lb (70.8 kg)   09/16/20 155 lb (70.3 kg)   08/07/19 158 lb (71.7 kg)     BP Readings from Last 3 Encounters:   10/14/20 118/60   09/16/20 130/68   08/07/19 128/70       PHYSICAL EXAM  Constitutional: Oriented to person, place, and time. Appears well-developed and well-nourished. HENT:   Head: Normocephalic and atraumatic. Eyes: EOM are normal. Pupils are equal, round, and reactive to light. Neck: Normal range of motion. Neck supple. No JVD present. Cardiovascular: Normal rate , normal heart sounds and intact distal pulses. Pulmonary/Chest: Effort normal and breath sounds normal. No respiratory distress. No wheezes. No rales. Abdominal: Soft. Bowel sounds are normal. No distension. There is no tenderness. Musculoskeletal: Normal range of motion. No edema. Neurological: Alert and oriented to person, place, and time. No cranial nerve deficit. Coordination normal.   Skin: Skin is warm and dry. Psychiatric: Normal mood and affect.        Lab Results   Component Value Date    CKTOTAL 168 12/14/2011    CKTOTAL 310 12/13/2011    CKMB 1.6 12/14/2011    CKMB 3.7 12/13/2011       Lab Results   Component Value Date    WBC 6.9 09/16/2020    RBC 3.75 09/16/2020    RBC 2.26 01/03/2012    HGB 12.1 09/16/2020    HCT 37.4 09/16/2020    MCV 99.7 09/16/2020    MCH 32.3 09/16/2020    MCHC 32.4 09/16/2020    RDW 12.4 03/05/2019     09/16/2020    MPV 11.4 09/16/2020       Lab Results   Component Value Date     09/16/2020    K 5.0 09/16/2020     09/16/2020    CO2 27 09/16/2020    BUN 17 09/16/2020    LABALBU 4.3 05/13/2016    LABALBU 3.5 12/17/2011    CREATININE 0.8 09/16/2020    CALCIUM 8.8 09/16/2020    LABGLOM >90 09/16/2020    GLUCOSE 177 09/16/2020    GLUCOSE 79 01/03/2012       Lab Results   Component Value Date    ALKPHOS 49 05/13/2016    ALT 24 05/13/2016    AST 25 05/13/2016    PROT 6.9 05/13/2016    BILITOT 0.4 05/13/2016    BILIDIR 0.2 12/17/2011    LABALBU 4.3 05/13/2016    LABALBU 3.5 12/17/2011       Lab Results   Component Value Date    MG 3.1 01/03/2012       Lab Results   Component Value Date    PROTIME 1.04 12/30/2011    PROTIME 1.02 12/29/2011         Lab Results   Component Value Date    LABA1C 7.5 11/22/2016       Lab Results   Component Value Date    TRIG 227 05/13/2016    HDL 38 05/13/2016    LDLCALC 44 05/13/2016       Lab Results   Component Value Date    TSH 1.188 08/07/2012         Testing Reviewed:      I haveindividually reviewed the below cardiac tests    EKG:    ECHO:   Results for orders placed during the hospital encounter of 11/27/17   ECHO Complete 2D W Doppler W Color    Narrative Transthoracic Echocardiography Report (TTE)     Demographics      Patient Name   Laith Funes  Gender              Male                  S      MR #           [id number]      Race                Other                                    Ethnicity            or       Account #      [de-identified]      Room Number      Accession      [id number]      Date of Study       11/27/2017   Number      Date of Birth  1955 Referring Physician Man Sams MD      Age            57 year(s)     Sonographer         Randy Marques, [facility]                                    Interpreting        Marni Montez DO                                 Physician     Procedure    Type of Study      TTE procedure:ECHOCARDIOGRAM COMPLETE 2D W DOPPLER W COLOR. Procedure Date  Date: 11/27/2017 Start: 03:13 PM    Study Location: Echo Lab  Technical Quality: Limited visualization due to poor acoustical window. Indications:Hypertension. Additional Medical History: Former smoker, coronary artery disease, CABG,  diabetes, hypertension, hyperlipidemia, GERD    Patient Status: Routine    Height: 61 inches Weight: 170 pounds BSA: 1.76 m^2 BMI: 32.12 kg/m^2    BP: 122/60 mmHg     Conclusions      Summary   There was mild global hypokinesis of the left ventricle. Ejection fraction is visually estimated at 40 to 45%. Mildly dilated left ventricle. Mildly dilated left atrium. Leaflets exhibited mildly increased thickness and mildly reduced cuspal   separation of the aortic valve. Structurally normal aortic valve. There is mild aortic stenosis with valve area of 1.59 sq cm. The maximum aortic valve gradient is 8 mmHg, the mean gradient is 5 mmHg,   and the peak velocity is 142 cm/s. Mild calcification of the posterior leaflet of the mitral valve. Mitral valve leaflet mobility is normal .   Trace mitral regurgitation is present. Signature      ----------------------------------------------------------------   Electronically signed by Marni Montez DO (Interpreting   physician) on 11/27/2017 at 05:56 PM   ----------------------------------------------------------------      Findings      Mitral Valve   Mild calcification of the posterior leaflet of the mitral valve. Mitral valve leaflet mobility is normal .   Trace mitral regurgitation is present.       Aortic Valve   Leaflets exhibited mildly increased thickness and mildly reduced cuspal   separation of the aortic valve. Structurally normal aortic valve. There is mild aortic stenosis with valve area of 1.59 sq cm. The maximum aortic valve gradient is 8 mmHg, the mean gradient is 5 mmHg,   and the peak velocity is 142 cm/s. Tricuspid Valve   Tricuspid valve is structurally normal.   Trivial tricuspid regurgitation visualized. Pulmonic Valve   Pulmonic valve is structurally normal.   Trivial pulmonic regurgitation visualized. Left Atrium   Mildly dilated left atrium. Left Ventricle   There was mild global hypokinesis of the left ventricle. Ejection fraction is visually estimated at 40 to 45%. Mildly dilated left ventricle. Right Atrium   The right atrium is of normal size. Right Ventricle   Normal right ventricular size and function. Pericardial Effusion   No evidence of any pericardial effusion. Aorta / Great Vessels   Aortic root dimension within normal limits.      M-Mode/2D Measurements & Calculations      LV Diastolic    LV Systolic Dimension: 4 cm AV Cusp Separation: 1.8 cmLA   Dimension: 5 cm LV Volume Diastolic: 219 ml Dimension: 4.5 cmAO Root   LV FS:20 %      LV Volume Systolic: 70 ml   Dimension: 3.1 cm   LV PW           LV EDV/LV EDV Index: 246   Diastolic: 1.2  MB/01 Q^8DC ESV/LV ESV   cm              Index: 70 ml/40 m^2   Septum          EF Calculated: 40.7 %       RV Diastolic Dimension: 3.2 cm   Diastolic: 1.2   cm                                          LA/Aorta: 1.45                      LVOT: 1.9 cm     Doppler Measurements & Calculations      MV Peak E-Wave: 90.8 AV Peak Velocity: 142    LVOT Peak Velocity: 79.7   cm/s                 cm/s                     cm/s   MV Peak A-Wave: 102  AV Peak Gradient: 8.07   LVOT Mean Velocity: 53.4   cm/s                 mmHg                     cm/s   MV E/A Ratio: 0.89   AV Mean Velocity: 104    LVOT Peak Gradient: 3   MV Peak Gradient:    cm/s                     mmHgLVOT Mean Gradient: 1   3.3 mmHg             AV Mean Gradient: 5 mmHg mmHg                        AV VTI: 28.7 cm   MV Deceleration      AV Area (Continuity):1.5 TV Peak E-Wave: 45.4 cm/s   Time: 211 msec       cm^2                     TV Peak A-Wave: 47.4 cm/s                           LVOT VTI: 15.2 cm        TV Peak Gradient: 0.82 mmHg   MV E' Septal         IVRT: 85 msec   Velocity: 4.68 cm/s                           PV Peak Velocity: 70.6 cm/s   MV A' Septal                                  PV Peak Gradient: 1.99 mmHg   Velocity: 7.51 cm/s  AV DVI (VTI): 0.53AV DVI   MV E' Lateral        (Vmax):0.56   Velocity: 9.26 cm/s   MV A' Lateral   Velocity: 7.7 cm/s   E/E' septal: 19.4   E/E' lateral: 9.81   MR Velocity: 458   cm/s     [email or web address]  [id number]       STRESS:    CATH:    Assessment/Plan       Diagnosis Orders   1. Shortness of breath       Shortness of breath  CAD s/p CABG  Ischemic CMP EF 40-45%  DM  CVA    Reports some shortness of breath  Got pro BNP which is elevated  Advised to increase his lasix 20mg to 40mg x 4 days and go back to 20mg   Walks with a roller walker  Reviewed EKG  The patient is asked to make an attempt to improve diet and exercise patterns to aid in medical management of this problem. Advised more plant based nutrition/meditarrean diet   Advised patient to call office or seek immediate medical attention if there is any new onset of  any chest pain, sob, palpitations, lightheadedness, dizziness, orthopnea, PND or pedal edema. All medication side effects were discussed in details. Thank youfor allowing me to participate in the care of this patient. Please do not hesitate to contact me for any further questions. Return in about 8 weeks (around 12/9/2020) for Regular follow up, Review testing.

## 2020-11-18 ENCOUNTER — OFFICE VISIT (OUTPATIENT)
Dept: CARDIOLOGY CLINIC | Age: 65
End: 2020-11-18
Payer: MEDICARE

## 2020-11-18 VITALS
HEART RATE: 80 BPM | SYSTOLIC BLOOD PRESSURE: 128 MMHG | DIASTOLIC BLOOD PRESSURE: 70 MMHG | WEIGHT: 156 LBS | HEIGHT: 60 IN | BODY MASS INDEX: 30.63 KG/M2

## 2020-11-18 PROCEDURE — 3017F COLORECTAL CA SCREEN DOC REV: CPT | Performed by: INTERNAL MEDICINE

## 2020-11-18 PROCEDURE — 99213 OFFICE O/P EST LOW 20 MIN: CPT | Performed by: INTERNAL MEDICINE

## 2020-11-18 PROCEDURE — G8417 CALC BMI ABV UP PARAM F/U: HCPCS | Performed by: INTERNAL MEDICINE

## 2020-11-18 PROCEDURE — G8484 FLU IMMUNIZE NO ADMIN: HCPCS | Performed by: INTERNAL MEDICINE

## 2020-11-18 PROCEDURE — G8428 CUR MEDS NOT DOCUMENT: HCPCS | Performed by: INTERNAL MEDICINE

## 2020-11-18 PROCEDURE — 1123F ACP DISCUSS/DSCN MKR DOCD: CPT | Performed by: INTERNAL MEDICINE

## 2020-11-18 PROCEDURE — 4040F PNEUMOC VAC/ADMIN/RCVD: CPT | Performed by: INTERNAL MEDICINE

## 2020-11-18 PROCEDURE — 1036F TOBACCO NON-USER: CPT | Performed by: INTERNAL MEDICINE

## 2020-11-18 RX ORDER — FUROSEMIDE 40 MG/1
TABLET ORAL
Qty: 90 TABLET | Refills: 3 | Status: SHIPPED | OUTPATIENT
Start: 2020-11-18

## 2020-11-18 NOTE — PROGRESS NOTES
Pt here for 6-8 week check up     Pt states med list is correct from last appt     Pt continues with pain due to fall , pain in left arm, legs weak was down for 7 hrs was not able to get up, sob

## 2020-11-18 NOTE — PROGRESS NOTES
[address] [place] [address] [facility] [address]  Dept: [phone]  Dept Fax: [phone]  Loc: [phone]    Visit Date: 11/18/2020    Mr. Rufina Rosenberg is a 77 y.o. male  who presented for:  Chief Complaint   Patient presents with    Check-Up    Coronary Artery Disease       HPI:   78 yo M c CAD s/p CABG, DM, LV dysfunction EF 40-45%,  CVA is here for a follow up. Patient has a fall recently, states he felt weak. Has chornic shortness of breath. Denies any chest pain, palpitations, lightheadedness, dizziness, orthopnea, PND or pedal edema. Current Outpatient Medications:     methylcellulose (EQ FIBER THERAPY) 500 MG TABS, EQ Fiber Therapy 500MG Oral Tablet EQ Fiber Therapy 500MG Oral Tablet 03/01/2019 Provider: 03-  [facility] 87 ([id number]), Disp: , Rfl:     mirtazapine (REMERON) 45 MG tablet, Take 45 mg by mouth nightly, Disp: , Rfl:     ranolazine (RANEXA) 1000 MG extended release tablet, Take 1 tablet by mouth twice daily, Disp: 180 tablet, Rfl: 0    metoprolol tartrate (LOPRESSOR) 25 MG tablet, TAKE 1 TABLET BY MOUTH TWICE DAILY, Disp: 180 tablet, Rfl: 3    isosorbide mononitrate (IMDUR) 30 MG extended release tablet, TAKE 1 TABLET BY MOUTH ONCE DAILY, Disp: 90 tablet, Rfl: 4    furosemide (LASIX) 20 MG tablet, TAKE ONE TABLET BY MOUTH ONCE DAILY, Disp: 90 tablet, Rfl: 3    nitroGLYCERIN (NITROSTAT) 0.4 MG SL tablet, Place 1 tablet under the tongue every 5 minutes as needed for Chest pain up to max of 3 total doses.  If no relief after 1 dose, call 911., Disp: 25 tablet, Rfl: 3    pioglitazone (ACTOS) 30 MG tablet, TAKE ONE TABLET BY MOUTH ONCE DAILY, Disp: 30 tablet, Rfl: 5    traMADol (ULTRAM) 50 MG tablet, TAKE ONE TABLET BY MOUTH TWICE DAILY AS NEEDED FOR PAIN, Disp: 60 tablet, Rfl: 2    enalapril (VASOTEC) 2.5 MG tablet, TAKE ONE TABLET BY MOUTH ONCE DAILY, Disp: 30 tablet, Rfl: 5    sitaGLIPtin (Aida Elias) 100 MG tablet, TAKE ONE TABLET BY MOUTH ONCE DAILY, Disp: 90 tablet, Rfl: 1    metFORMIN (GLUCOPHAGE) 1000 MG tablet, TAKE ONE TABLET BY MOUTH TWICE DAILY WITH MEALS, Disp: 60 tablet, Rfl: 5    pantoprazole (PROTONIX) 40 MG tablet, TAKE ONE TABLET BY MOUTH ONCE DAILY, Disp: 30 tablet, Rfl: 5    pravastatin (PRAVACHOL) 40 MG tablet, TAKE ONE TABLET BY MOUTH ONCE DAILY, Disp: 30 tablet, Rfl: 5    ferrous sulfate 325 (65 FE) MG tablet, TAKE ONE TABLET BY MOUTH ONCE DAILY WITH BREAKFAST, Disp: 30 tablet, Rfl: 5    Omega-3 Fatty Acids (FISH OIL BURP-LESS PO), Take by mouth, Disp: , Rfl:     FREESTYLE LITE strip, USE TO CHECK GLUCOSE ONCE DAILY AS NEEDED, Disp: 100 each, Rfl: 11    FREESTYLE LITE strip, CHECK GLUCOSE ONCE EVERY DAY AS NEEDED, Disp: 30 each, Rfl: 11    acetaminophen (EQL ARTHRITIS PAIN RELIEF) 650 MG CR tablet, Take 650 mg by mouth every 8 hours as needed for Pain., Disp: , Rfl:     aspirin 81 MG EC tablet, Take 81 mg by mouth daily. , Disp: , Rfl:     Multiple Vitamin (MULTIVITAMIN PO), Take 1 tablet by mouth 2 times daily. , Disp: , Rfl:     Past [address]  has a past medical history of Arthritis, CAD (coronary artery disease), Cataract, Glaucoma, Hearing aid worn, Hypertension, Type II or unspecified type diabetes mellitus without mention of complication, not stated as uncontrolled, and Unspecified cerebral artery occlusion with cerebral infarction. Social History  Pieter  reports that he quit smoking about 35 years ago. He has never used smokeless tobacco. He reports that he does not drink alcohol or use drugs. Family History  Pieter family history includes Diabetes in his mother; Heart Disease in his father and mother; High Blood Pressure in his mother.     Past Surgical History   Past Surgical History:   Procedure Laterality Date    COLONOSCOPY  9/2/15    CORONARY ARTERY BYPASS GRAFT  12-30-11    NECK SURGERY      left side blood clot    STOMACH SURGERY      UPPER GASTROINTESTINAL ENDOSCOPY         Subjective:     REVIEW OF SYSTEMS  Constitutional: denies sweats, chills and fever  HENT: denies  congestion, sinus pressure, sneezing and sore throat. Eyes: denies  pain, discharge, redness and itching. Respiratory: denies apnea, cough  Gastrointestinal: denies blood in stool, constipation, diarrhea   Endocrine: denies cold intolerance, heat intolerance, polydipsia. Genitourinary: denies dysuria, enuresis, flank pain and hematuria. Musculoskeletal: denies arthralgias, joint swelling and neck pain. Neurological: denies numbness and headaches. Psychiatric/Behavioral: denies agitation, confusion, decreased concentration and dysphoric mood    All others reviewed and are negative. Objective:     /70   Pulse 80   Ht 5' (1.524 m)   Wt 156 lb (70.8 kg)   BMI 30.47 kg/m²     Wt Readings from Last 3 Encounters:   11/18/20 156 lb (70.8 kg)   10/14/20 156 lb (70.8 kg)   09/16/20 155 lb (70.3 kg)     BP Readings from Last 3 Encounters:   11/18/20 128/70   10/14/20 118/60   09/16/20 130/68       PHYSICAL EXAM  Constitutional: Oriented to person, place, and time. Appears well-developed and well-nourished. HENT:   Head: Normocephalic and atraumatic. Eyes: EOM are normal. Pupils are equal, round, and reactive to light. Neck: Normal range of motion. Neck supple. No JVD present. Cardiovascular: Normal rate , normal heart sounds and intact distal pulses. Pulmonary/Chest: Effort normal and breath sounds normal. No respiratory distress. No wheezes. No rales. Abdominal: Soft. Bowel sounds are normal. No distension. There is no tenderness. Musculoskeletal: Normal range of motion. No edema. Neurological: Alert and oriented to person, place, and time. No cranial nerve deficit. Coordination normal.   Skin: Skin is warm and dry. Psychiatric: Normal mood and affect.        Lab Results   Component Value Date    CKTOTAL 168 12/14/2011    CKTOTAL 310 12/13/2011    CKMB 1.6 12/14/2011    CKMB 3.7 12/13/2011       Lab Results   Component Value Date    WBC 6.9 09/16/2020    RBC 3.75 09/16/2020    RBC 2.26 01/03/2012    HGB 12.1 09/16/2020    HCT 37.4 09/16/2020    MCV 99.7 09/16/2020    MCH 32.3 09/16/2020    MCHC 32.4 09/16/2020    RDW 12.4 03/05/2019     09/16/2020    MPV 11.4 09/16/2020       Lab Results   Component Value Date     09/16/2020    K 5.0 09/16/2020     09/16/2020    CO2 27 09/16/2020    BUN 17 09/16/2020    LABALBU 4.3 05/13/2016    LABALBU 3.5 12/17/2011    CREATININE 0.8 09/16/2020    CALCIUM 8.8 09/16/2020    LABGLOM >90 09/16/2020    GLUCOSE 177 09/16/2020    GLUCOSE 79 01/03/2012       Lab Results   Component Value Date    ALKPHOS 49 05/13/2016    ALT 24 05/13/2016    AST 25 05/13/2016    PROT 6.9 05/13/2016    BILITOT 0.4 05/13/2016    BILIDIR 0.2 12/17/2011    LABALBU 4.3 05/13/2016    LABALBU 3.5 12/17/2011       Lab Results   Component Value Date    MG 3.1 01/03/2012       Lab Results   Component Value Date    PROTIME 1.04 12/30/2011    PROTIME 1.02 12/29/2011         Lab Results   Component Value Date    LABA1C 7.5 11/22/2016       Lab Results   Component Value Date    TRIG 227 05/13/2016    HDL 38 05/13/2016    LDLCALC 44 05/13/2016       Lab Results   Component Value Date    TSH 1.188 08/07/2012         Testing Reviewed:      I haveindividually reviewed the below cardiac tests    EKG:    ECHO:   Results for orders placed during the hospital encounter of 11/27/17   ECHO Complete 2D W Doppler W Color    Narrative Transthoracic Echocardiography Report (TTE)     Demographics      Patient Name   Shital Rush  Gender              Male                  S      MR #           [id number]      Race                Other                                    Ethnicity            or       Account #      [de-identified]      Room Number      Accession      [id number]      Date of Study       11/27/2017   Number      Date of Birth  1955 Referring Physician Eliceo Gonzalez MD      Age            57 year(s)     Sonographer         Kenneth Rodriguez [facility]                                    Interpreting        Giovanni Henry DO                                 Physician     Procedure    Type of Study      TTE procedure:ECHOCARDIOGRAM COMPLETE 2D W DOPPLER W COLOR. Procedure Date  Date: 11/27/2017 Start: 03:13 PM    Study Location: Echo Lab  Technical Quality: Limited visualization due to poor acoustical window. Indications:Hypertension. Additional Medical History: Former smoker, coronary artery disease, CABG,  diabetes, hypertension, hyperlipidemia, GERD    Patient Status: Routine    Height: 61 inches Weight: 170 pounds BSA: 1.76 m^2 BMI: 32.12 kg/m^2    BP: 122/60 mmHg     Conclusions      Summary   There was mild global hypokinesis of the left ventricle. Ejection fraction is visually estimated at 40 to 45%. Mildly dilated left ventricle. Mildly dilated left atrium. Leaflets exhibited mildly increased thickness and mildly reduced cuspal   separation of the aortic valve. Structurally normal aortic valve. There is mild aortic stenosis with valve area of 1.59 sq cm. The maximum aortic valve gradient is 8 mmHg, the mean gradient is 5 mmHg,   and the peak velocity is 142 cm/s. Mild calcification of the posterior leaflet of the mitral valve. Mitral valve leaflet mobility is normal .   Trace mitral regurgitation is present. Signature      ----------------------------------------------------------------   Electronically signed by Giovanni Henry DO (Interpreting   physician) on 11/27/2017 at 05:56 PM   ----------------------------------------------------------------      Findings      Mitral Valve   Mild calcification of the posterior leaflet of the mitral valve. Mitral valve leaflet mobility is normal .   Trace mitral regurgitation is present.       Aortic Valve   Leaflets exhibited mildly increased thickness and mildly reduced cuspal   separation of the aortic valve. Structurally normal aortic valve. There is mild aortic stenosis with valve area of 1.59 sq cm. The maximum aortic valve gradient is 8 mmHg, the mean gradient is 5 mmHg,   and the peak velocity is 142 cm/s. Tricuspid Valve   Tricuspid valve is structurally normal.   Trivial tricuspid regurgitation visualized. Pulmonic Valve   Pulmonic valve is structurally normal.   Trivial pulmonic regurgitation visualized. Left Atrium   Mildly dilated left atrium. Left Ventricle   There was mild global hypokinesis of the left ventricle. Ejection fraction is visually estimated at 40 to 45%. Mildly dilated left ventricle. Right Atrium   The right atrium is of normal size. Right Ventricle   Normal right ventricular size and function. Pericardial Effusion   No evidence of any pericardial effusion. Aorta / Great Vessels   Aortic root dimension within normal limits.      M-Mode/2D Measurements & Calculations      LV Diastolic    LV Systolic Dimension: 4 cm AV Cusp Separation: 1.8 cmLA   Dimension: 5 cm LV Volume Diastolic: 965 ml Dimension: 4.5 cmAO Root   LV FS:20 %      LV Volume Systolic: 70 ml   Dimension: 3.1 cm   LV PW           LV EDV/LV EDV Index: 417   Diastolic: 1.2  CY/93 J^2MT ESV/LV ESV   cm              Index: 70 ml/40 m^2   Septum          EF Calculated: 40.7 %       RV Diastolic Dimension: 3.2 cm   Diastolic: 1.2   cm                                          LA/Aorta: 1.45                      LVOT: 1.9 cm     Doppler Measurements & Calculations      MV Peak E-Wave: 90.8 AV Peak Velocity: 142    LVOT Peak Velocity: 79.7   cm/s                 cm/s                     cm/s   MV Peak A-Wave: 102  AV Peak Gradient: 8.07   LVOT Mean Velocity: 53.4   cm/s                 mmHg                     cm/s   MV E/A Ratio: 0.89   AV Mean Velocity: 104    LVOT Peak Gradient: 3   MV Peak Gradient:    cm/s                     mmHgLVOT Mean Gradient: 1   3.3 mmHg             AV Mean Gradient: 5 mmHg mmHg                        AV VTI: 28.7 cm   MV Deceleration      AV Area (Continuity):1.5 TV Peak E-Wave: 45.4 cm/s   Time: 211 msec       cm^2                     TV Peak A-Wave: 47.4 cm/s                           LVOT VTI: 15.2 cm        TV Peak Gradient: 0.82 mmHg   MV E' Septal         IVRT: 85 msec   Velocity: 4.68 cm/s                           PV Peak Velocity: 70.6 cm/s   MV A' Septal                                  PV Peak Gradient: 1.99 mmHg   Velocity: 7.51 cm/s  AV DVI (VTI): 0.53AV DVI   MV E' Lateral        (Vmax):0.56   Velocity: 9.26 cm/s   MV A' Lateral   Velocity: 7.7 cm/s   E/E' septal: 19.4   E/E' lateral: 9.81   MR Velocity: 458   cm/s     [email or web address]  [email or web address]       STRESS:    CATH:    Assessment/Plan       Diagnosis Orders   1. Chronic congestive heart failure, unspecified heart failure type ([facility] [address]       CAD s/p CABG  Ischemic CMP EF 40-45%  DM  CVA    Reports some shortness of breath  Got pro BNP which is elevated  Advised to increase his lasix 20mg to 40mg   Walks with a roller walker  Reviewed EKG  The patient is asked to make an attempt to improve diet and exercise patterns to aid in medical management of this problem. Advised more plant based nutrition/meditarrean diet   Advised patient to call office or seek immediate medical attention if there is any new onset of  any chest pain, sob, palpitations, lightheadedness, dizziness, orthopnea, PND or pedal edema. All medication side effects were discussed in details. Thank youfor allowing me to participate in the care of this patient. Please do not hesitate to contact me for any further questions. Return in about 4 months (around 3/18/2021) for Regular follow up, Review testing. Electronically signed by Aime Herr MD [facility]  11/18/2020 at 3:22 PM EDT

## 2021-01-07 ENCOUNTER — TELEPHONE (OUTPATIENT)
Dept: CARDIOLOGY CLINIC | Age: 66
End: 2021-01-07